# Patient Record
Sex: MALE | Race: WHITE
[De-identification: names, ages, dates, MRNs, and addresses within clinical notes are randomized per-mention and may not be internally consistent; named-entity substitution may affect disease eponyms.]

---

## 2018-02-25 ENCOUNTER — HOSPITAL ENCOUNTER (OUTPATIENT)
Dept: HOSPITAL 56 - MW.ED | Age: 68
Setting detail: OBSERVATION
LOS: 2 days | Discharge: HOME | End: 2018-02-27
Attending: FAMILY MEDICINE | Admitting: FAMILY MEDICINE
Payer: OTHER GOVERNMENT

## 2018-02-25 DIAGNOSIS — R47.81: ICD-10-CM

## 2018-02-25 DIAGNOSIS — R55: Primary | ICD-10-CM

## 2018-02-25 DIAGNOSIS — F17.210: ICD-10-CM

## 2018-02-25 LAB
CHLORIDE SERPL-SCNC: 105 MMOL/L (ref 98–110)
SODIUM SERPL-SCNC: 139 MMOL/L (ref 136–146)

## 2018-02-25 PROCEDURE — 82140 ASSAY OF AMMONIA: CPT

## 2018-02-25 PROCEDURE — 70544 MR ANGIOGRAPHY HEAD W/O DYE: CPT

## 2018-02-25 PROCEDURE — 36415 COLL VENOUS BLD VENIPUNCTURE: CPT

## 2018-02-25 PROCEDURE — 70450 CT HEAD/BRAIN W/O DYE: CPT

## 2018-02-25 PROCEDURE — 84100 ASSAY OF PHOSPHORUS: CPT

## 2018-02-25 PROCEDURE — 83735 ASSAY OF MAGNESIUM: CPT

## 2018-02-25 PROCEDURE — 85610 PROTHROMBIN TIME: CPT

## 2018-02-25 PROCEDURE — 80061 LIPID PANEL: CPT

## 2018-02-25 PROCEDURE — 84484 ASSAY OF TROPONIN QUANT: CPT

## 2018-02-25 PROCEDURE — 99285 EMERGENCY DEPT VISIT HI MDM: CPT

## 2018-02-25 PROCEDURE — 80053 COMPREHEN METABOLIC PANEL: CPT

## 2018-02-25 PROCEDURE — 81001 URINALYSIS AUTO W/SCOPE: CPT

## 2018-02-25 PROCEDURE — 93306 TTE W/DOPPLER COMPLETE: CPT

## 2018-02-25 PROCEDURE — 80305 DRUG TEST PRSMV DIR OPT OBS: CPT

## 2018-02-25 PROCEDURE — 71046 X-RAY EXAM CHEST 2 VIEWS: CPT

## 2018-02-25 PROCEDURE — 87804 INFLUENZA ASSAY W/OPTIC: CPT

## 2018-02-25 PROCEDURE — A9577 INJ MULTIHANCE: HCPCS

## 2018-02-25 PROCEDURE — 80048 BASIC METABOLIC PNL TOTAL CA: CPT

## 2018-02-25 PROCEDURE — 87086 URINE CULTURE/COLONY COUNT: CPT

## 2018-02-25 PROCEDURE — 83036 HEMOGLOBIN GLYCOSYLATED A1C: CPT

## 2018-02-25 PROCEDURE — 87186 SC STD MICRODIL/AGAR DIL: CPT

## 2018-02-25 PROCEDURE — 84443 ASSAY THYROID STIM HORMONE: CPT

## 2018-02-25 PROCEDURE — 93005 ELECTROCARDIOGRAM TRACING: CPT

## 2018-02-25 PROCEDURE — 82150 ASSAY OF AMYLASE: CPT

## 2018-02-25 PROCEDURE — 70549 MR ANGIOGRAPH NECK W/O&W/DYE: CPT

## 2018-02-25 PROCEDURE — 70553 MRI BRAIN STEM W/O & W/DYE: CPT

## 2018-02-25 PROCEDURE — 85025 COMPLETE CBC W/AUTO DIFF WBC: CPT

## 2018-02-25 PROCEDURE — G0378 HOSPITAL OBSERVATION PER HR: HCPCS

## 2018-02-25 PROCEDURE — 83880 ASSAY OF NATRIURETIC PEPTIDE: CPT

## 2018-02-25 PROCEDURE — 87088 URINE BACTERIA CULTURE: CPT

## 2018-02-25 RX ADMIN — DEXTROSE SCH: 10 SOLUTION INTRAVENOUS at 18:37

## 2018-02-25 NOTE — EDM.PDOC
ED HPI GENERAL MEDICAL PROBLEM





- General


Chief Complaint: Cardiovascular Problem


Stated Complaint: DIZZY


Time Seen by Provider: 02/25/18 14:16


Source of Information: Reports: Patient, EMS


History Limitations: Reports: No Limitations





- History of Present Illness


INITIAL COMMENTS - FREE TEXT/NARRATIVE: 





HISTORY AND PHYSICAL:


[]67-year-old male presenting by EMS after a near-syncopal episode





History of Present Illness:


[][place] farm with [place] and says all of a sudden became 

dizzy had copious amounts of fluid from his mouth it tasted salty and now has a 

headache. He has had similar small episodes like this once or twice a year for 

the last couple years





Review of Systems:


As per history of present illness and below otherwise all 


systems reviewed and negative.  





Past medical history:


As per history of present illness and as reviewed below


otherwise noncontributory.





Surgical history:


As per history of present illness and as reviewed below


otherwise noncontributory.





Social history:


No reported history of drug or alcohol abuse.





Family history:


As per history of present illness and as reviewed below


otherwise noncontributory.





Physical exam:


Alert and oriented male answering questions appropriately PERRLA hand grasps 

are equal no drift noted. Unable to really lift his left leg from the table but 

complains of a bad hip and this is not unusual.


HEENT: Atraumatic, normocehpalic, pupils reactive, negative for conjunctival 

pallor or scleral icterus, mucous membranes moist, throat clear, neck supple, 

nontender, trachea midline.  


Lungs: Mild crackles on auscultation, breath sounds equal bilaterally, chest 

non tender.  


Heart: S1S2, regular, negative for clicks, rubs, or JVD.


Abdomen: Soft, nondistended, nontender.  Negative for masses or 

hepatossplenmegaly. Negative for costovertebral tenderness.


Pelvis: Stable nontender.


Genitourinary: Deferred.


Rectal: Deferred


Extremities: Atraumatic, negative for cords or calf pain.  


Neurovascular unremarkable.


Neuro:  Awake, alert, oriented.  Cranial nerves II through XII


unremarkable.  Cerebellum unremarkable.  Motor and sensory unremarkable 

throughout.  Exam nonfocal.  





Discussed case with Dr. Conklin who has accepted the patient for observation 

on telemetry


Discussed this case with the patient and his wife who are agreeable to this 

admission.





Diagnostics:


[CBC CMP amylase lipase lactic acid EKG head CT]





Therapeutics:


[IV fluids]





Impression:


[Near syncopal episode]





Plan:


[]Refer to observation





Definitive disposition and diagnosis as appropriate pending


reevaluation and review of above.  





Onset: Today, Sudden


Duration: Hour(s):


Location: Reports: Head, Generalized


  ** headache


Pain Score (Numeric/FACES): 1





- Related Data


 Allergies











Allergy/AdvReac Type Severity Reaction Status Date / Time


 


No Known Allergies Allergy   Verified 02/25/18 13:43











Home Meds: 


 Home Meds





. [No Known Home Meds]  06/06/16 [History]











Past Medical History


HEENT History: Reports: None


Cardiovascular History: Reports: None


Respiratory History: Reports: None


Gastrointestinal History: Reports: None


Genitourinary History: Reports: None


Musculoskeletal History: Reports: Fracture


Neurological History: Reports: None


Psychiatric History: Reports: None


Endocrine/Metabolic History: Reports: None


Hematologic History: Reports: None


Immunologic History: Reports: None


Oncologic (Cancer) History: Reports: None


Dermatologic History: Reports: None





- Infectious Disease History


Infectious Disease History: Reports: Chicken Pox, Shingles





- Past Surgical History


Head Surgeries/Procedures: Reports: None


Musculoskeletal Surgical History: Reports: Shoulder Surgery





Social & Family History





- Family History


Family Medical History: Noncontributory





- Tobacco Use


Smoking Status *Q: Current Every Day Smoker


Years of Tobacco use: 35


Packs/Tins Daily: 1





- Caffeine Use


Caffeine Use: Reports: Coffee





- Recreational Drug Use


Recreational Drug Use: No





ED ROS GENERAL





- Review of Systems


Review Of Systems: ROS reveals no pertinent complaints other than HPI.





ED EXAM, GENERAL





- Physical Exam


Exam: See Below (see dictation)





EKG INTERPRETATION


EKG Date: 02/25/18


Rhythm: NSR


Comparison: NA - No Prior EKG





Course





- Vital Signs


Last Recorded V/S: 


 Last Vital Signs











Temp  36.1 C   02/25/18 13:41


 


Pulse  68   02/25/18 13:41


 


Resp  18   02/25/18 13:41


 


BP  125/78   02/25/18 13:41


 


Pulse Ox  98   02/25/18 13:41














- Orders/Labs/Meds


Orders: 


 Active Orders 24 hr











 Category Date Time Status


 


 Patient Status [ADT] Stat ADT  02/25/18 16:31 Ordered


 


 Cardiac Monitoring [RC] .AS DIRECTED Care  02/25/18 14:18 Active


 


 EKG Documentation Completion [RC] STAT Care  02/25/18 14:18 Active


 


 Oxygen Therapy, ED [RC] ASDIRECTED Care  02/25/18 14:21 Active


 


 Head wo Cont [CT] Stat Exams  02/25/18 15:35 Taken


 


 CULTURE URINE [RM] Stat Lab  02/25/18 14:59 Received


 


 Sodium Chloride 0.9% [Saline Flush] Med  02/25/18 14:18 Active





 10 ml FLUSH ASDIRECTED PRN   


 


 Sodium Chloride 0.9% [Saline Flush] Med  02/25/18 14:18 Active





 2.5 ml FLUSH ASDIRECTED PRN   


 


 Saline Lock Insert [OM.PC] Stat Oth  02/25/18 14:18 Ordered








 Medication Orders





Sodium Chloride (Saline Flush)  10 ml FLUSH ASDIRECTED PRN


   PRN Reason: Keep Vein Open


Sodium Chloride (Saline Flush)  2.5 ml FLUSH ASDIRECTED PRN


   PRN Reason: Keep Vein Open








Labs: 


 Laboratory Tests











  02/25/18 02/25/18 02/25/18 Range/Units





  14:30 14:30 14:30 


 


WBC  11.08 H    (4.0-11.0)  K/uL


 


RBC  4.35 L    (4.50-5.90)  M/uL


 


Hgb  15.0    (13.0-17.0)  g/dL


 


Hct  44.0    (38.0-50.0)  %


 


MCV  101.1 H    (80.0-98.0)  fL


 


MCH  34.5 H    (27.0-32.0)  pg


 


MCHC  34.1    (31.0-37.0)  g/dL


 


RDW Std Deviation  47.4    (28.0-62.0)  fl


 


RDW Coeff of Elizabeth  13    (11.0-15.0)  %


 


Plt Count  243    (150-400)  K/uL


 


MPV  9.60    (7.40-12.00)  fL


 


Neut % (Auto)  80.7 H    (48.0-80.0)  %


 


Lymph % (Auto)  13.3 L    (16.0-40.0)  %


 


Mono % (Auto)  4.2    (0.0-15.0)  %


 


Eos % (Auto)  1.6    (0.0-7.0)  %


 


Baso % (Auto)  0.2    (0.0-1.5)  %


 


Neut # (Auto)  8.9 H    (1.4-5.7)  K/uL


 


Lymph # (Auto)  1.5    (0.6-2.4)  K/uL


 


Mono # (Auto)  0.5    (0.0-0.8)  K/uL


 


Eos # (Auto)  0.2    (0.0-0.7)  K/uL


 


Baso # (Auto)  0.0    (0.0-0.1)  K/uL


 


Nucleated RBC %  0.0    /100WBC


 


Nucleated RBCs #  0    K/uL


 


INR   1.03   


 


Sodium    139  (136-146)  mmol/L


 


Potassium    4.9  (3.5-5.1)  mmol/L


 


Chloride    105  ()  mmol/L


 


Carbon Dioxide    25  (21-31)  mmol/L


 


BUN    21  (6.0-23.0)  mg/dL


 


Creatinine    0.9  (0.6-1.5)  mg/dL


 


Est Cr Clr Drug Dosing    72.06  mL/min


 


Estimated GFR (MDRD)    > 60.0  ml/min


 


Glucose    93  ()  mg/dL


 


Calcium    9.7  (8.8-10.8)  mg/dL


 


Total Bilirubin    0.6  (0.1-1.5)  mg/dL


 


AST    15  (5-40)  IU/L


 


ALT    13  (8-54)  IU/L


 


Alkaline Phosphatase    45  ()  


 


Ammonia     (14-68)  UG/DL


 


Troponin I    < 0.10  (0.0-0.29)  NG/ML


 


B-Natriuretic Peptide     (<100)  PG/ML


 


Total Protein    7.4  (6.0-8.0)  g/dL


 


Albumin    4.1  (3.4-4.8)  g/dL


 


Globulin    3.3  (2.0-3.5)  g/dL


 


Albumin/Globulin Ratio    1.2 L  (1.3-2.8)  


 


Amylase    74  (10-90)  U/L


 


TSH 3rd Generation    1.84  (0.47-5.0)  uIU/mL


 


Urine Color     


 


Urine Appearance     


 


Urine pH     (5.0-8.0)  


 


Ur Specific Gravity     (1.001-1.035)  


 


Urine Protein     (NEGATIVE)  mg/dL


 


Urine Glucose (UA)     (NEGATIVE)  mg/dL


 


Urine Ketones     (NEGATIVE)  mg/dL


 


Urine Occult Blood     (NEGATIVE)  


 


Urine Nitrite     (NEGATIVE)  


 


Urine Bilirubin     (NEGATIVE)  


 


Urine Urobilinogen     (<2.0)  EU/dL


 


Ur Leukocyte Esterase     (NEGATIVE)  


 


Urine RBC     (0-2/HPF)  


 


Urine WBC     (0-5/HPF)  


 


Ur Epithelial Cells     (NONE-FEW)  


 


Amorphous Sediment     (NEGATIVE)  


 


Urine Bacteria     (NEGATIVE)  


 


Urine Mucus     (NONE-MOD)  


 


Urine Opiates Screen     (NEGATIVE)  


 


Ur Oxycodone Screen     (NEGATIVE)  


 


Urine Methadone Screen     (NEGATIVE)  


 


Ur Barbiturates Screen     (NEGATIVE)  


 


Ur Phencyclidine Scrn     (NEGATIVE)  


 


Ur Amphetamine Screen     (NEGATIVE)  


 


U Methamphetamines Scrn     (NEGATIVE)  


 


U Benzodiazepines Scrn     (NEGATIVE)  


 


U Cocaine Metab Screen     (NEGATIVE)  


 


U Marijuana (THC) Screen     (NEGATIVE)  














  02/25/18 02/25/18 02/25/18 Range/Units





  14:30 14:30 14:59 


 


WBC     (4.0-11.0)  K/uL


 


RBC     (4.50-5.90)  M/uL


 


Hgb     (13.0-17.0)  g/dL


 


Hct     (38.0-50.0)  %


 


MCV     (80.0-98.0)  fL


 


MCH     (27.0-32.0)  pg


 


MCHC     (31.0-37.0)  g/dL


 


RDW Std Deviation     (28.0-62.0)  fl


 


RDW Coeff of Elizabeth     (11.0-15.0)  %


 


Plt Count     (150-400)  K/uL


 


MPV     (7.40-12.00)  fL


 


Neut % (Auto)     (48.0-80.0)  %


 


Lymph % (Auto)     (16.0-40.0)  %


 


Mono % (Auto)     (0.0-15.0)  %


 


Eos % (Auto)     (0.0-7.0)  %


 


Baso % (Auto)     (0.0-1.5)  %


 


Neut # (Auto)     (1.4-5.7)  K/uL


 


Lymph # (Auto)     (0.6-2.4)  K/uL


 


Mono # (Auto)     (0.0-0.8)  K/uL


 


Eos # (Auto)     (0.0-0.7)  K/uL


 


Baso # (Auto)     (0.0-0.1)  K/uL


 


Nucleated RBC %     /100WBC


 


Nucleated RBCs #     K/uL


 


INR     


 


Sodium     (136-146)  mmol/L


 


Potassium     (3.5-5.1)  mmol/L


 


Chloride     ()  mmol/L


 


Carbon Dioxide     (21-31)  mmol/L


 


BUN     (6.0-23.0)  mg/dL


 


Creatinine     (0.6-1.5)  mg/dL


 


Est Cr Clr Drug Dosing     mL/min


 


Estimated GFR (MDRD)     ml/min


 


Glucose     ()  mg/dL


 


Calcium     (8.8-10.8)  mg/dL


 


Total Bilirubin     (0.1-1.5)  mg/dL


 


AST     (5-40)  IU/L


 


ALT     (8-54)  IU/L


 


Alkaline Phosphatase     ()  


 


Ammonia   25   (14-68)  UG/DL


 


Troponin I     (0.0-0.29)  NG/ML


 


B-Natriuretic Peptide  < 15    (<100)  PG/ML


 


Total Protein     (6.0-8.0)  g/dL


 


Albumin     (3.4-4.8)  g/dL


 


Globulin     (2.0-3.5)  g/dL


 


Albumin/Globulin Ratio     (1.3-2.8)  


 


Amylase     (10-90)  U/L


 


TSH 3rd Generation     (0.47-5.0)  uIU/mL


 


Urine Color    YELLOW  


 


Urine Appearance    CLEAR  


 


Urine pH    6.0  (5.0-8.0)  


 


Ur Specific Gravity    >= 1.030  (1.001-1.035)  


 


Urine Protein    TRACE  (NEGATIVE)  mg/dL


 


Urine Glucose (UA)    NEGATIVE  (NEGATIVE)  mg/dL


 


Urine Ketones    NEGATIVE  (NEGATIVE)  mg/dL


 


Urine Occult Blood    TRACE-INTACT  (NEGATIVE)  


 


Urine Nitrite    NEGATIVE  (NEGATIVE)  


 


Urine Bilirubin    NEGATIVE  (NEGATIVE)  


 


Urine Urobilinogen    0.2  (<2.0)  EU/dL


 


Ur Leukocyte Esterase    NEGATIVE  (NEGATIVE)  


 


Urine RBC    0-1  (0-2/HPF)  


 


Urine WBC    RARE  (0-5/HPF)  


 


Ur Epithelial Cells    FEW  (NONE-FEW)  


 


Amorphous Sediment    LIGHT  (NEGATIVE)  


 


Urine Bacteria    FEW  (NEGATIVE)  


 


Urine Mucus    MODERATE  (NONE-MOD)  


 


Urine Opiates Screen     (NEGATIVE)  


 


Ur Oxycodone Screen     (NEGATIVE)  


 


Urine Methadone Screen     (NEGATIVE)  


 


Ur Barbiturates Screen     (NEGATIVE)  


 


Ur Phencyclidine Scrn     (NEGATIVE)  


 


Ur Amphetamine Screen     (NEGATIVE)  


 


U Methamphetamines Scrn     (NEGATIVE)  


 


U Benzodiazepines Scrn     (NEGATIVE)  


 


U Cocaine Metab Screen     (NEGATIVE)  


 


U Marijuana (THC) Screen     (NEGATIVE)  














  02/25/18 Range/Units





  14:59 


 


WBC   (4.0-11.0)  K/uL


 


RBC   (4.50-5.90)  M/uL


 


Hgb   (13.0-17.0)  g/dL


 


Hct   (38.0-50.0)  %


 


MCV   (80.0-98.0)  fL


 


MCH   (27.0-32.0)  pg


 


MCHC   (31.0-37.0)  g/dL


 


RDW Std Deviation   (28.0-62.0)  fl


 


RDW Coeff of Elizabeth   (11.0-15.0)  %


 


Plt Count   (150-400)  K/uL


 


MPV   (7.40-12.00)  fL


 


Neut % (Auto)   (48.0-80.0)  %


 


Lymph % (Auto)   (16.0-40.0)  %


 


Mono % (Auto)   (0.0-15.0)  %


 


Eos % (Auto)   (0.0-7.0)  %


 


Baso % (Auto)   (0.0-1.5)  %


 


Neut # (Auto)   (1.4-5.7)  K/uL


 


Lymph # (Auto)   (0.6-2.4)  K/uL


 


Mono # (Auto)   (0.0-0.8)  K/uL


 


Eos # (Auto)   (0.0-0.7)  K/uL


 


Baso # (Auto)   (0.0-0.1)  K/uL


 


Nucleated RBC %   /100WBC


 


Nucleated RBCs #   K/uL


 


INR   


 


Sodium   (136-146)  mmol/L


 


Potassium   (3.5-5.1)  mmol/L


 


Chloride   ()  mmol/L


 


Carbon Dioxide   (21-31)  mmol/L


 


BUN   (6.0-23.0)  mg/dL


 


Creatinine   (0.6-1.5)  mg/dL


 


Est Cr Clr Drug Dosing   mL/min


 


Estimated GFR (MDRD)   ml/min


 


Glucose   ()  mg/dL


 


Calcium   (8.8-10.8)  mg/dL


 


Total Bilirubin   (0.1-1.5)  mg/dL


 


AST   (5-40)  IU/L


 


ALT   (8-54)  IU/L


 


Alkaline Phosphatase   ()  


 


Ammonia   (14-68)  UG/DL


 


Troponin I   (0.0-0.29)  NG/ML


 


B-Natriuretic Peptide   (<100)  PG/ML


 


Total Protein   (6.0-8.0)  g/dL


 


Albumin   (3.4-4.8)  g/dL


 


Globulin   (2.0-3.5)  g/dL


 


Albumin/Globulin Ratio   (1.3-2.8)  


 


Amylase   (10-90)  U/L


 


TSH 3rd Generation   (0.47-5.0)  uIU/mL


 


Urine Color   


 


Urine Appearance   


 


Urine pH   (5.0-8.0)  


 


Ur Specific Gravity   (1.001-1.035)  


 


Urine Protein   (NEGATIVE)  mg/dL


 


Urine Glucose (UA)   (NEGATIVE)  mg/dL


 


Urine Ketones   (NEGATIVE)  mg/dL


 


Urine Occult Blood   (NEGATIVE)  


 


Urine Nitrite   (NEGATIVE)  


 


Urine Bilirubin   (NEGATIVE)  


 


Urine Urobilinogen   (<2.0)  EU/dL


 


Ur Leukocyte Esterase   (NEGATIVE)  


 


Urine RBC   (0-2/HPF)  


 


Urine WBC   (0-5/HPF)  


 


Ur Epithelial Cells   (NONE-FEW)  


 


Amorphous Sediment   (NEGATIVE)  


 


Urine Bacteria   (NEGATIVE)  


 


Urine Mucus   (NONE-MOD)  


 


Urine Opiates Screen  NEGATIVE  (NEGATIVE)  


 


Ur Oxycodone Screen  NEGATIVE  (NEGATIVE)  


 


Urine Methadone Screen  NEGATIVE  (NEGATIVE)  


 


Ur Barbiturates Screen  NEGATIVE  (NEGATIVE)  


 


Ur Phencyclidine Scrn  NEGATIVE  (NEGATIVE)  


 


Ur Amphetamine Screen  NEGATIVE  (NEGATIVE)  


 


U Methamphetamines Scrn  NEGATIVE  (NEGATIVE)  


 


U Benzodiazepines Scrn  NEGATIVE  (NEGATIVE)  


 


U Cocaine Metab Screen  NEGATIVE  (NEGATIVE)  


 


U Marijuana (THC) Screen  NEGATIVE  (NEGATIVE)  











Meds: 


Medications











Generic Name Dose Route Start Last Admin





  Trade Name Freq  PRN Reason Stop Dose Admin


 


Sodium Chloride  10 ml  02/25/18 14:18  





  Saline Flush  FLUSH   





  ASDIRECTED PRN   





  Keep Vein Open   


 


Sodium Chloride  2.5 ml  02/25/18 14:18  





  Saline Flush  FLUSH   





  ASDIRECTED PRN   





  Keep Vein Open   














Discontinued Medications














Generic Name Dose Route Start Last Admin





  Trade Name Freq  PRN Reason Stop Dose Admin


 


Sodium Chloride  1,000 mls @ 999 mls/hr  02/25/18 14:20  





  Normal Saline  IV  02/25/18 15:20  





  STAT ONE   














Departure





- Departure


Time of Disposition: 16:35


Disposition: Refer to Observation


Condition: Good


Clinical Impression: 


 Near syncope





Instructions:  Near-Syncope, Easy-to-Read


Referrals: 


PCP,None [Primary Care Provider] - 


Forms:  ED Department Discharge





- My Orders


Last 24 Hours: 


My Active Orders





02/25/18 14:18


Cardiac Monitoring [RC] .AS DIRECTED 


EKG Documentation Completion [RC] STAT 


Sodium Chloride 0.9% [Saline Flush]   10 ml FLUSH ASDIRECTED PRN 


Sodium Chloride 0.9% [Saline Flush]   2.5 ml FLUSH ASDIRECTED PRN 


Saline Lock Insert [OM.PC] Stat 





02/25/18 14:21


Oxygen Therapy, ED [RC] ASDIRECTED 





02/25/18 14:59


CULTURE URINE [RM] Stat 





02/25/18 15:35


Head wo Cont [CT] Stat 





02/25/18 16:31


Patient Status [ADT] Stat 














- Assessment/Plan


Last 24 Hours: 


My Active Orders





02/25/18 14:18


Cardiac Monitoring [RC] .AS DIRECTED 


EKG Documentation Completion [RC] STAT 


Sodium Chloride 0.9% [Saline Flush]   10 ml FLUSH ASDIRECTED PRN 


Sodium Chloride 0.9% [Saline Flush]   2.5 ml FLUSH ASDIRECTED PRN 


Saline Lock Insert [OM.PC] Stat 





02/25/18 14:21


Oxygen Therapy, ED [RC] ASDIRECTED 





02/25/18 14:59


CULTURE URINE [RM] Stat 





02/25/18 15:35


Head wo Cont [CT] Stat 





02/25/18 16:31


Patient Status [ADT] Stat

## 2018-02-25 NOTE — PCM.HP
H&P History of Present Illness





- General


Date of Service: 02/25/18


Admit Problem/Dx: 





Near syncope





Source of Information: Patient, Family


History Limitations: Reports: No Limitations





- History of Present Illness


Initial Comments - Free Text/Narative: 


67-year-old male presenting to the emergency department by EMS from [place] for a 

near syncopal episode with no significant past medical history.





Patient is accompanied by his wife who helps with the history. Patient states 

that while he was "raking corn" with his son he became lightheaded, sweaty, hot

, and felt generalized weakness. He also reports a salty taste in his mouth as 

well as nausea. He did not vomit but felt completely weak. He denies any 

specific left or right-sided weakness but generalized weakness. Observers also 

state that he was very pale. His wife and son state that he was slurring his 

speech but did not notice any facial drooping. Patient states that this has 

happened in the past approximately twice per year he has had similar episodes. 

He denies any associated chest pain, palpitations, shortness of breath, or 

syncopal events. Up until the event he was feeling his normal self. He does 

admit to a cough with some clear phlegm production. He denies any fever, chills

, diarrhea, sore throat, abdominal pain, leg pain or swelling. Patient states 

that he does smoke cigars but he does not inhale. He is a former alcoholic and 

has been abstinent for the past 18 years. He also reports that he has noticed 

over the past year that his right toes become numb and now his left toes are 

starting become numb as well. He has no history of diabetes and has been 

checked for this in the past. [facility] He reports no 

allergies to medications or recent illness.





Of note: Patient does report an approximate 40 lb wt loss over the past few 

years.  





Emergency department:





CBC showed mild leukocytosis of 11.08, CMP, troponin, UA, urine tox, influenza 

were all unremarkable.  EKG showed no acute signs of ischemia.  CT head 

unremarkable.  





Patient admitted for near syncope.  





  ** headache


Pain Score (Numeric/FACES): 1





- Related Data


Allergies/Adverse Reactions: 


 Allergies











Allergy/AdvReac Type Severity Reaction Status Date / Time


 


No Known Allergies Allergy   Verified 02/25/18 13:43











Home Medications: 


 Home Meds





. [No Known Home Meds]  06/06/16 [History]











Past Medical History


HEENT History: Reports: None


Cardiovascular History: Reports: None


Respiratory History: Reports: None


Gastrointestinal History: Reports: None


Genitourinary History: Reports: None


Musculoskeletal History: Reports: Fracture


Neurological History: Reports: None


Psychiatric History: Reports: None


Endocrine/Metabolic History: Reports: None


Hematologic History: Reports: None


Immunologic History: Reports: None


Oncologic (Cancer) History: Reports: None


Dermatologic History: Reports: None





- Infectious Disease History


Infectious Disease History: Reports: Chicken Pox, Shingles





- Past Surgical History


Head Surgeries/Procedures: Reports: None


Musculoskeletal Surgical History: Reports: Shoulder Surgery





Social & Family History





- Family History


Family Medical History: Noncontributory





- Tobacco Use


Smoking Status *Q: Current Every Day Smoker


Years of Tobacco use: 35


Packs/Tins Daily: 1





- Caffeine Use


Caffeine Use: Reports: Coffee





- Recreational Drug Use


Recreational Drug Use: No





H&P Review of Systems





- Review of Systems:


Review Of Systems: See Below


General: Reports: Weakness, Fatigue.  Denies: Fever, Chills, Malaise


HEENT: Reports: Headaches.  Denies: Sinus Congestion, Sore Throat


Pulmonary: Reports: Cough, Sputum.  Denies: Shortness of Breath, Wheezing


Cardiovascular: Denies: Chest Pain, Palpitations, Edema


Gastrointestinal: Denies: Abdominal Pain, Black Stool, Bloody Stool, Diarrhea, 

Nausea, Vomiting


Genitourinary: Denies: Dysuria, Hematuria


Musculoskeletal: Denies: Neck Pain, Leg Pain


Skin: Denies: Cyanosis


Psychiatric: Denies: Confusion


Neurological: Reports: Headache, Trouble Speaking, Weakness.  Denies: Confusion

, Dizziness


Hematologic/Lymphatic: Denies: Anemia


Immunologic: Denies: Anaphylaxis





Exam





- Exam


Exam: See Below





- Vital Signs


Vital Signs: 


 Last Vital Signs











Temp  96.9 F   02/25/18 13:41


 


Pulse  68   02/25/18 13:41


 


Resp  18   02/25/18 13:41


 


BP  125/78   02/25/18 13:41


 


Pulse Ox  98   02/25/18 13:41











Weight: 65.771 kg





- Exam


Quality Assessment: DVT Prophylaxis


General: Alert, Oriented, Cooperative


HEENT: Conjunctiva Clear, EACs Clear, EOMI, Hearing Intact, Mucosa Moist & Pink

, Nares Patent, Normal Nasal Septum, Posterior Pharynx Clear, PERRLA


Neck: Supple, Trachea Midline, 2


Lungs: Clear to Auscultation, Normal Respiratory Effort


Cardiovascular: Regular Rate, Regular Rhythm


GI/Abdominal Exam: Normal Bowel Sounds, Soft, Non-Tender, No Organomegaly, No 

Distention


 (Male) Exam: Deferred


Rectal (Males) Exam: Deferred


Back Exam: Normal Inspection


Extremities: Normal Inspection, Non-Tender, No Pedal Edema, Normal Capillary 

Refill


Peripheral Pulses: 2+: Radial (L), Radial (R), Posterior Tibial (L), Posterior 

Tibial (R), Dorsalis Pedis (L), Dorsalis Pedis (R)


Skin: Warm, Dry, Intact


Neurological: Cranial Nerves Intact, Strength Equal Bilateral, Normal Speech, 

Normal Tone, Sensation Intact


Neuro Extensive - Mental Status: Alert, Oriented x3, Normal Mood/Affect, Normal 

Cognition, Memory Intact


Neuro Extensive - Motor, Sensory, Reflexes: CN II-XII Intact.  No: Tongue 

Deviation (L), Tongue Deviation (R), Dysarthria, Receptive Aphasia, Expressive 

Aphasia, Facial palsy (L), Facial Palsy (R), Facial Palsy w Forehead, 

Hemeplagia (R), Hemeplagia (L), Pronator Drift (R), Pronator Drift (L), 

Abnormal Finger to Nose, Abnormal Heel to Alcazar, Abnormal Light Touch


Psychiatric: Alert, Normal Affect, Normal Mood





- Patient Data


Lab Results Last 24 hrs: 


 Laboratory Results - last 24 hr











  02/25/18 02/25/18 02/25/18 Range/Units





  14:30 14:30 14:30 


 


WBC  11.08 H    (4.0-11.0)  K/uL


 


RBC  4.35 L    (4.50-5.90)  M/uL


 


Hgb  15.0    (13.0-17.0)  g/dL


 


Hct  44.0    (38.0-50.0)  %


 


MCV  101.1 H    (80.0-98.0)  fL


 


MCH  34.5 H    (27.0-32.0)  pg


 


MCHC  34.1    (31.0-37.0)  g/dL


 


RDW Std Deviation  47.4    (28.0-62.0)  fl


 


RDW Coeff of Elizabeth  13    (11.0-15.0)  %


 


Plt Count  243    (150-400)  K/uL


 


MPV  9.60    (7.40-12.00)  fL


 


Neut % (Auto)  80.7 H    (48.0-80.0)  %


 


Lymph % (Auto)  13.3 L    (16.0-40.0)  %


 


Mono % (Auto)  4.2    (0.0-15.0)  %


 


Eos % (Auto)  1.6    (0.0-7.0)  %


 


Baso % (Auto)  0.2    (0.0-1.5)  %


 


Neut # (Auto)  8.9 H    (1.4-5.7)  K/uL


 


Lymph # (Auto)  1.5    (0.6-2.4)  K/uL


 


Mono # (Auto)  0.5    (0.0-0.8)  K/uL


 


Eos # (Auto)  0.2    (0.0-0.7)  K/uL


 


Baso # (Auto)  0.0    (0.0-0.1)  K/uL


 


Nucleated RBC %  0.0    /100WBC


 


Nucleated RBCs #  0    K/uL


 


INR   1.03   


 


Sodium    139  (136-146)  mmol/L


 


Potassium    4.9  (3.5-5.1)  mmol/L


 


Chloride    105  ()  mmol/L


 


Carbon Dioxide    25  (21-31)  mmol/L


 


BUN    21  (6.0-23.0)  mg/dL


 


Creatinine    0.9  (0.6-1.5)  mg/dL


 


Est Cr Clr Drug Dosing    72.06  mL/min


 


Estimated GFR (MDRD)    > 60.0  ml/min


 


Glucose    93  ()  mg/dL


 


Calcium    9.7  (8.8-10.8)  mg/dL


 


Total Bilirubin    0.6  (0.1-1.5)  mg/dL


 


AST    15  (5-40)  IU/L


 


ALT    13  (8-54)  IU/L


 


Alkaline Phosphatase    45  ()  


 


Ammonia     (14-68)  UG/DL


 


Troponin I    < 0.10  (0.0-0.29)  NG/ML


 


B-Natriuretic Peptide     (<100)  PG/ML


 


Total Protein    7.4  (6.0-8.0)  g/dL


 


Albumin    4.1  (3.4-4.8)  g/dL


 


Globulin    3.3  (2.0-3.5)  g/dL


 


Albumin/Globulin Ratio    1.2 L  (1.3-2.8)  


 


Amylase    74  (10-90)  U/L


 


TSH 3rd Generation    1.84  (0.47-5.0)  uIU/mL


 


Urine Color     


 


Urine Appearance     


 


Urine pH     (5.0-8.0)  


 


Ur Specific Gravity     (1.001-1.035)  


 


Urine Protein     (NEGATIVE)  mg/dL


 


Urine Glucose (UA)     (NEGATIVE)  mg/dL


 


Urine Ketones     (NEGATIVE)  mg/dL


 


Urine Occult Blood     (NEGATIVE)  


 


Urine Nitrite     (NEGATIVE)  


 


Urine Bilirubin     (NEGATIVE)  


 


Urine Urobilinogen     (<2.0)  EU/dL


 


Ur Leukocyte Esterase     (NEGATIVE)  


 


Urine RBC     (0-2/HPF)  


 


Urine WBC     (0-5/HPF)  


 


Ur Epithelial Cells     (NONE-FEW)  


 


Amorphous Sediment     (NEGATIVE)  


 


Urine Bacteria     (NEGATIVE)  


 


Urine Mucus     (NONE-MOD)  


 


Urine Opiates Screen     (NEGATIVE)  


 


Ur Oxycodone Screen     (NEGATIVE)  


 


Urine Methadone Screen     (NEGATIVE)  


 


Ur Barbiturates Screen     (NEGATIVE)  


 


Ur Phencyclidine Scrn     (NEGATIVE)  


 


Ur Amphetamine Screen     (NEGATIVE)  


 


U Methamphetamines Scrn     (NEGATIVE)  


 


U Benzodiazepines Scrn     (NEGATIVE)  


 


U Cocaine Metab Screen     (NEGATIVE)  


 


U Marijuana (THC) Screen     (NEGATIVE)  














  02/25/18 02/25/18 02/25/18 Range/Units





  14:30 14:30 14:59 


 


WBC     (4.0-11.0)  K/uL


 


RBC     (4.50-5.90)  M/uL


 


Hgb     (13.0-17.0)  g/dL


 


Hct     (38.0-50.0)  %


 


MCV     (80.0-98.0)  fL


 


MCH     (27.0-32.0)  pg


 


MCHC     (31.0-37.0)  g/dL


 


RDW Std Deviation     (28.0-62.0)  fl


 


RDW Coeff of Elizabeth     (11.0-15.0)  %


 


Plt Count     (150-400)  K/uL


 


MPV     (7.40-12.00)  fL


 


Neut % (Auto)     (48.0-80.0)  %


 


Lymph % (Auto)     (16.0-40.0)  %


 


Mono % (Auto)     (0.0-15.0)  %


 


Eos % (Auto)     (0.0-7.0)  %


 


Baso % (Auto)     (0.0-1.5)  %


 


Neut # (Auto)     (1.4-5.7)  K/uL


 


Lymph # (Auto)     (0.6-2.4)  K/uL


 


Mono # (Auto)     (0.0-0.8)  K/uL


 


Eos # (Auto)     (0.0-0.7)  K/uL


 


Baso # (Auto)     (0.0-0.1)  K/uL


 


Nucleated RBC %     /100WBC


 


Nucleated RBCs #     K/uL


 


INR     


 


Sodium     (136-146)  mmol/L


 


Potassium     (3.5-5.1)  mmol/L


 


Chloride     ()  mmol/L


 


Carbon Dioxide     (21-31)  mmol/L


 


BUN     (6.0-23.0)  mg/dL


 


Creatinine     (0.6-1.5)  mg/dL


 


Est Cr Clr Drug Dosing     mL/min


 


Estimated GFR (MDRD)     ml/min


 


Glucose     ()  mg/dL


 


Calcium     (8.8-10.8)  mg/dL


 


Total Bilirubin     (0.1-1.5)  mg/dL


 


AST     (5-40)  IU/L


 


ALT     (8-54)  IU/L


 


Alkaline Phosphatase     ()  


 


Ammonia   25   (14-68)  UG/DL


 


Troponin I     (0.0-0.29)  NG/ML


 


B-Natriuretic Peptide  < 15    (<100)  PG/ML


 


Total Protein     (6.0-8.0)  g/dL


 


Albumin     (3.4-4.8)  g/dL


 


Globulin     (2.0-3.5)  g/dL


 


Albumin/Globulin Ratio     (1.3-2.8)  


 


Amylase     (10-90)  U/L


 


TSH 3rd Generation     (0.47-5.0)  uIU/mL


 


Urine Color    YELLOW  


 


Urine Appearance    CLEAR  


 


Urine pH    6.0  (5.0-8.0)  


 


Ur Specific Gravity    >= 1.030  (1.001-1.035)  


 


Urine Protein    TRACE  (NEGATIVE)  mg/dL


 


Urine Glucose (UA)    NEGATIVE  (NEGATIVE)  mg/dL


 


Urine Ketones    NEGATIVE  (NEGATIVE)  mg/dL


 


Urine Occult Blood    TRACE-INTACT  (NEGATIVE)  


 


Urine Nitrite    NEGATIVE  (NEGATIVE)  


 


Urine Bilirubin    NEGATIVE  (NEGATIVE)  


 


Urine Urobilinogen    0.2  (<2.0)  EU/dL


 


Ur Leukocyte Esterase    NEGATIVE  (NEGATIVE)  


 


Urine RBC    0-1  (0-2/HPF)  


 


Urine WBC    RARE  (0-5/HPF)  


 


Ur Epithelial Cells    FEW  (NONE-FEW)  


 


Amorphous Sediment    LIGHT  (NEGATIVE)  


 


Urine Bacteria    FEW  (NEGATIVE)  


 


Urine Mucus    MODERATE  (NONE-MOD)  


 


Urine Opiates Screen     (NEGATIVE)  


 


Ur Oxycodone Screen     (NEGATIVE)  


 


Urine Methadone Screen     (NEGATIVE)  


 


Ur Barbiturates Screen     (NEGATIVE)  


 


Ur Phencyclidine Scrn     (NEGATIVE)  


 


Ur Amphetamine Screen     (NEGATIVE)  


 


U Methamphetamines Scrn     (NEGATIVE)  


 


U Benzodiazepines Scrn     (NEGATIVE)  


 


U Cocaine Metab Screen     (NEGATIVE)  


 


U Marijuana (THC) Screen     (NEGATIVE)  














  02/25/18 Range/Units





  14:59 


 


WBC   (4.0-11.0)  K/uL


 


RBC   (4.50-5.90)  M/uL


 


Hgb   (13.0-17.0)  g/dL


 


Hct   (38.0-50.0)  %


 


MCV   (80.0-98.0)  fL


 


MCH   (27.0-32.0)  pg


 


MCHC   (31.0-37.0)  g/dL


 


RDW Std Deviation   (28.0-62.0)  fl


 


RDW Coeff of Elizabeth   (11.0-15.0)  %


 


Plt Count   (150-400)  K/uL


 


MPV   (7.40-12.00)  fL


 


Neut % (Auto)   (48.0-80.0)  %


 


Lymph % (Auto)   (16.0-40.0)  %


 


Mono % (Auto)   (0.0-15.0)  %


 


Eos % (Auto)   (0.0-7.0)  %


 


Baso % (Auto)   (0.0-1.5)  %


 


Neut # (Auto)   (1.4-5.7)  K/uL


 


Lymph # (Auto)   (0.6-2.4)  K/uL


 


Mono # (Auto)   (0.0-0.8)  K/uL


 


Eos # (Auto)   (0.0-0.7)  K/uL


 


Baso # (Auto)   (0.0-0.1)  K/uL


 


Nucleated RBC %   /100WBC


 


Nucleated RBCs #   K/uL


 


INR   


 


Sodium   (136-146)  mmol/L


 


Potassium   (3.5-5.1)  mmol/L


 


Chloride   ()  mmol/L


 


Carbon Dioxide   (21-31)  mmol/L


 


BUN   (6.0-23.0)  mg/dL


 


Creatinine   (0.6-1.5)  mg/dL


 


Est Cr Clr Drug Dosing   mL/min


 


Estimated GFR (MDRD)   ml/min


 


Glucose   ()  mg/dL


 


Calcium   (8.8-10.8)  mg/dL


 


Total Bilirubin   (0.1-1.5)  mg/dL


 


AST   (5-40)  IU/L


 


ALT   (8-54)  IU/L


 


Alkaline Phosphatase   ()  


 


Ammonia   (14-68)  UG/DL


 


Troponin I   (0.0-0.29)  NG/ML


 


B-Natriuretic Peptide   (<100)  PG/ML


 


Total Protein   (6.0-8.0)  g/dL


 


Albumin   (3.4-4.8)  g/dL


 


Globulin   (2.0-3.5)  g/dL


 


Albumin/Globulin Ratio   (1.3-2.8)  


 


Amylase   (10-90)  U/L


 


TSH 3rd Generation   (0.47-5.0)  uIU/mL


 


Urine Color   


 


Urine Appearance   


 


Urine pH   (5.0-8.0)  


 


Ur Specific Gravity   (1.001-1.035)  


 


Urine Protein   (NEGATIVE)  mg/dL


 


Urine Glucose (UA)   (NEGATIVE)  mg/dL


 


Urine Ketones   (NEGATIVE)  mg/dL


 


Urine Occult Blood   (NEGATIVE)  


 


Urine Nitrite   (NEGATIVE)  


 


Urine Bilirubin   (NEGATIVE)  


 


Urine Urobilinogen   (<2.0)  EU/dL


 


Ur Leukocyte Esterase   (NEGATIVE)  


 


Urine RBC   (0-2/HPF)  


 


Urine WBC   (0-5/HPF)  


 


Ur Epithelial Cells   (NONE-FEW)  


 


Amorphous Sediment   (NEGATIVE)  


 


Urine Bacteria   (NEGATIVE)  


 


Urine Mucus   (NONE-MOD)  


 


Urine Opiates Screen  NEGATIVE  (NEGATIVE)  


 


Ur Oxycodone Screen  NEGATIVE  (NEGATIVE)  


 


Urine Methadone Screen  NEGATIVE  (NEGATIVE)  


 


Ur Barbiturates Screen  NEGATIVE  (NEGATIVE)  


 


Ur Phencyclidine Scrn  NEGATIVE  (NEGATIVE)  


 


Ur Amphetamine Screen  NEGATIVE  (NEGATIVE)  


 


U Methamphetamines Scrn  NEGATIVE  (NEGATIVE)  


 


U Benzodiazepines Scrn  NEGATIVE  (NEGATIVE)  


 


U Cocaine Metab Screen  NEGATIVE  (NEGATIVE)  


 


U Marijuana (THC) Screen  NEGATIVE  (NEGATIVE)  











Result Diagrams: 


 02/25/18 14:30





 02/25/18 14:30


Manuel Results Last 24 hrs: 


 Microbiology











 02/25/18 14:52 Influenza Type A Antigen Screen - Final





 Nasopharyngeal Swab    NEGATIVE INFLUENZA A VIRUS AG





 Influenza Type B Antigen Screen - Final





    NEGATIVE INFLUENZA B VIRUS AG














*Q Meaningful Use (ADM)





- VTE *Q


VTE Criteria *Q: 








- Stroke *Q


Stroke Criteria *Q: 








- AMI *Q


AMI Criteria *Q: 








- Problem List


(1) Slurring of speech


SNOMED Code(s): 573907202


   ICD Code: R47.81 - SLURRED SPEECH   Status: Acute   Priority: High   Current 

Visit: Yes   





(2) Near syncope


SNOMED Code(s): 103679180


   ICD Code: R55 - SYNCOPE AND COLLAPSE   Status: Acute   Priority: High   

Current Visit: Yes   


Problem List Initiated/Reviewed/Updated: Yes


Orders Last 24hrs: 


 Active Orders 24 hr











 Category Date Time Status


 


 Cardiac Monitoring [RC] .AS DIRECTED Care  02/25/18 14:18 Active


 


 EKG Documentation Completion [RC] STAT Care  02/25/18 14:18 Active


 


 Oxygen Therapy, ED [RC] ASDIRECTED Care  02/25/18 14:21 Active


 


 Head wo Cont [CT] Stat Exams  02/25/18 15:35 Taken


 


 CULTURE URINE [RM] Stat Lab  02/25/18 14:59 Received


 


 Sodium Chloride 0.9% [Saline Flush] Med  02/25/18 14:18 Active





 10 ml FLUSH ASDIRECTED PRN   


 


 Sodium Chloride 0.9% [Saline Flush] Med  02/25/18 14:18 Active





 2.5 ml FLUSH ASDIRECTED PRN   


 


 Saline Lock Insert [OM.PC] Stat Oth  02/25/18 14:18 Ordered








 Medication Orders





Sodium Chloride (Saline Flush)  10 ml FLUSH ASDIRECTED PRN


   PRN Reason: Keep Vein Open


Sodium Chloride (Saline Flush)  2.5 ml FLUSH ASDIRECTED PRN


   PRN Reason: Keep Vein Open








Assessment/Plan Comment:: 


67-year-old male admitted 2/25/184 near syncope with no significant past 

medical history.





Near syncope: Patient has had multiple episodes of this in the past. Most have 

been unobserved however this one observers reported that he did have some 

slurring of speech but no significant focal neurologic deficits. Secondary to 

this history I do think it is warranted that we do a TIA workup. He will be 

placed on telemetry and will get a lipid panel, MRI brain, MRA head and neck, 

echocardiogram, as well as hemoglobin A1c. We will also start him on an ASA 81 

mg. He did receive 325 mg emergency department. We'll also trend his troponins 

every 6 hours however there are no signs of acute ischemic changes on EKG and 

initial troponin was negative.





VTE proph: SCD, Heparin





Dispo: 1-2 days pending.  Would like to have a new PCP established on 

discharge.

## 2018-02-26 LAB
CHLORIDE SERPL-SCNC: 109 MMOL/L (ref 98–110)
SODIUM SERPL-SCNC: 141 MMOL/L (ref 136–146)

## 2018-02-26 RX ADMIN — DEXTROSE SCH: 10 SOLUTION INTRAVENOUS at 06:12

## 2018-02-26 NOTE — PCM.PN
- General Info


Date of Service: 02/26/18


Admission Dx/Problem (Free Text): 





Near syncope





Subjective Update: 





Doing well this morning. No concerns. Ambulating per self in room. No chest pain

, palpitations or SOB. No syncope or lightheadedness. 


Functional Status: Reports: Pain Controlled, Tolerating Diet, Ambulating, 

Urinating





- Review of Systems


HEENT: Reports: No Symptoms


Pulmonary: Reports: No Symptoms.  Denies: Shortness of Breath


Cardiovascular: Reports: No Symptoms.  Denies: Chest Pain


Gastrointestinal: Reports: No Symptoms.  Denies: Abdominal Pain, Nausea, 

Vomiting


Genitourinary: Reports: No Symptoms.  Denies: Dysuria, Frequency, Burning


Musculoskeletal: Reports: No Symptoms.  Denies: Neck Pain


Neurological: Reports: No Symptoms.  Denies: Confusion, Trouble Speaking


Psychiatric: Reports: No Symptoms.  Denies: Confusion





- Patient Data


Vitals - Most Recent: 


 Last Vital Signs











Temp  98.7 F   02/26/18 08:00


 


Pulse  69   02/26/18 08:00


 


Resp  18   02/26/18 08:00


 


BP  108/63   02/26/18 08:00


 


Pulse Ox  96   02/26/18 08:00








 





Orthostatic Blood Pressure [     121/78


Standing]                        


Orthostatic Blood Pressure [     135/79


Sitting]                         


Orthostatic Blood Pressure [     117/72


Supine]                          








Weight - Most Recent: 65.771 kg


I&O - Last 24 Hours: 


 Intake & Output











 02/25/18 02/26/18 02/26/18





 22:59 06:59 14:59


 


Intake Total  1550 


 


Output Total  825 


 


Balance  725 











Lab Results Last 24 Hours: 


 Laboratory Results - last 24 hr











  02/25/18 02/26/18 02/26/18 Range/Units





  20:33 02:23 02:23 


 


WBC   7.23   (4.0-11.0)  K/uL


 


RBC   3.64 L   (4.50-5.90)  M/uL


 


Hgb   12.6 L   (13.0-17.0)  g/dL


 


Hct   36.9 L   (38.0-50.0)  %


 


MCV   101.4 H   (80.0-98.0)  fL


 


MCH   34.6 H   (27.0-32.0)  pg


 


MCHC   34.1   (31.0-37.0)  g/dL


 


RDW Std Deviation   46.8   (28.0-62.0)  fl


 


RDW Coeff of Elizabeth   13   (11.0-15.0)  %


 


Plt Count   225   (150-400)  K/uL


 


MPV   9.70   (7.40-12.00)  fL


 


Neut % (Auto)   50.1   (48.0-80.0)  %


 


Lymph % (Auto)   36.0   (16.0-40.0)  %


 


Mono % (Auto)   6.2   (0.0-15.0)  %


 


Eos % (Auto)   7.1 H   (0.0-7.0)  %


 


Baso % (Auto)   0.6   (0.0-1.5)  %


 


Neut # (Auto)   3.6   (1.4-5.7)  K/uL


 


Lymph # (Auto)   2.6 H   (0.6-2.4)  K/uL


 


Mono # (Auto)   0.5   (0.0-0.8)  K/uL


 


Eos # (Auto)   0.5   (0.0-0.7)  K/uL


 


Baso # (Auto)   0.0   (0.0-0.1)  K/uL


 


Nucleated RBC %   0.0   /100WBC


 


Nucleated RBCs #   0   K/uL


 


Sodium    141  (136-146)  mmol/L


 


Potassium    3.9  (3.5-5.1)  mmol/L


 


Chloride    109  ()  mmol/L


 


Carbon Dioxide    25  (21-31)  mmol/L


 


BUN    17  (6.0-23.0)  mg/dL


 


Creatinine    1.0  (0.6-1.5)  mg/dL


 


Est Cr Clr Drug Dosing    64.86  mL/min


 


Estimated GFR (MDRD)    > 60.0  ml/min


 


Glucose    97  ()  mg/dL


 


Hemoglobin A1c     (4.5-6.2)  %


 


Calcium    8.6 L  (8.8-10.8)  mg/dL


 


Phosphorus    3.5  (2.4-4.7)  mg/dL


 


Magnesium    1.9  (1.5-2.3)  mEq/L


 


Troponin I  < 0.10    (0.0-0.29)  NG/ML


 


Triglycerides    48  ()  mg/dL


 


Cholesterol    127 L  (131-240)  mg/dL


 


LDL Cholesterol, Calc    87  ()  mg/dL


 


VLDL Cholesterol    10  (5-55)  mg/dL


 


HDL Cholesterol    30 L  (40-80)  mg/dL


 


Cholesterol/HDL Ratio    4.2  (3.3-6.0)  














  02/26/18 02/26/18 Range/Units





  02:23 02:23 


 


WBC    (4.0-11.0)  K/uL


 


RBC    (4.50-5.90)  M/uL


 


Hgb    (13.0-17.0)  g/dL


 


Hct    (38.0-50.0)  %


 


MCV    (80.0-98.0)  fL


 


MCH    (27.0-32.0)  pg


 


MCHC    (31.0-37.0)  g/dL


 


RDW Std Deviation    (28.0-62.0)  fl


 


RDW Coeff of Elizabeth    (11.0-15.0)  %


 


Plt Count    (150-400)  K/uL


 


MPV    (7.40-12.00)  fL


 


Neut % (Auto)    (48.0-80.0)  %


 


Lymph % (Auto)    (16.0-40.0)  %


 


Mono % (Auto)    (0.0-15.0)  %


 


Eos % (Auto)    (0.0-7.0)  %


 


Baso % (Auto)    (0.0-1.5)  %


 


Neut # (Auto)    (1.4-5.7)  K/uL


 


Lymph # (Auto)    (0.6-2.4)  K/uL


 


Mono # (Auto)    (0.0-0.8)  K/uL


 


Eos # (Auto)    (0.0-0.7)  K/uL


 


Baso # (Auto)    (0.0-0.1)  K/uL


 


Nucleated RBC %    /100WBC


 


Nucleated RBCs #    K/uL


 


Sodium    (136-146)  mmol/L


 


Potassium    (3.5-5.1)  mmol/L


 


Chloride    ()  mmol/L


 


Carbon Dioxide    (21-31)  mmol/L


 


BUN    (6.0-23.0)  mg/dL


 


Creatinine    (0.6-1.5)  mg/dL


 


Est Cr Clr Drug Dosing    mL/min


 


Estimated GFR (MDRD)    ml/min


 


Glucose    ()  mg/dL


 


Hemoglobin A1c   5.6  (4.5-6.2)  %


 


Calcium    (8.8-10.8)  mg/dL


 


Phosphorus    (2.4-4.7)  mg/dL


 


Magnesium    (1.5-2.3)  mEq/L


 


Troponin I  < 0.10   (0.0-0.29)  NG/ML


 


Triglycerides    ()  mg/dL


 


Cholesterol    (131-240)  mg/dL


 


LDL Cholesterol, Calc    ()  mg/dL


 


VLDL Cholesterol    (5-55)  mg/dL


 


HDL Cholesterol    (40-80)  mg/dL


 


Cholesterol/HDL Ratio    (3.3-6.0)  











Med Orders - Current: 


 Current Medications





Acetaminophen (Tylenol)  650 mg PO Q4H PRN


   PRN Reason: Pain (Mild 1-3)/fever


Aspirin (Aspirin)  81 mg PO DAILY [facility]


   Last Admin: 02/26/18 09:13 Dose:  81 mg


Morphine Sulfate (Morphine)  2 mg IVPUSH Q2H PRN


   PRN Reason: Pain (severe 7-10)


   Stop: 02/26/18 17:18


Ondansetron HCl (Zofran Odt)  4 mg PO Q4H PRN


   PRN Reason: nausea, able to take PO


Ondansetron HCl (Zofran)  4 mg IVPUSH Q4H PRN


   PRN Reason: Nausea


Sodium Chloride (Saline Flush)  10 ml FLUSH ASDIRECTED PRN


   PRN Reason: Keep Vein Open


Sodium Chloride (Saline Flush)  2.5 ml FLUSH ASDIRECTED PRN


   PRN Reason: Keep Vein Open


Temazepam (Restoril)  15 mg PO BEDTIME PRN


   PRN Reason: Sleep





Discontinued Medications





Aspirin (Aspirin)  324 mg PO ONETIME ONE


   Stop: 02/25/18 17:07


   Last Admin: 02/25/18 17:14 Dose:  324 mg


Heparin Sodium (Porcine) (Heparin Sodium)  5,000 units SUBCUT Q12H [facility]


   Last Admin: 02/26/18 06:12 Dose:  Not Given


Sodium Chloride (Normal Saline)  1,000 mls @ 999 mls/hr IV STAT ONE


   Stop: 02/25/18 15:20


   Last Admin: 02/25/18 17:00 Dose:  999 mls/hr


Lactated Ringer's (Ringers, Lactated)  1,000 mls @ 125 mls/hr IV ASDIRECTED [facility]


   Last Admin: 02/26/18 06:11 Dose:  125 mls/hr











- Exam


General: Alert, Oriented, Cooperative, No Acute Distress


Neck: Supple


Lungs: Clear to Auscultation, Normal Respiratory Effort


Cardiovascular: Regular Rate, Regular Rhythm


GI/Abdominal Exam: Normal Bowel Sounds, Soft, Non-Tender, No Organomegaly, No 

Distention, No Abnormal Bruit, No Mass, Pelvis Stable


Back Exam: Normal Inspection, Full Range of Motion


Extremities: Normal Inspection, Normal Range of Motion, Non-Tender, No Pedal 

Edema, Normal Capillary Refill


Neurological: No New Focal Deficit


Psy/Mental Status: Alert, Normal Affect, Normal Mood





- Problem List & Annotations


(1) Near syncope


SNOMED Code(s): 047372108


   Code(s): R55 - SYNCOPE AND COLLAPSE   Status: Acute   Priority: High   

Current Visit: Yes   





(2) Slurring of speech


SNOMED Code(s): 944162394


   Code(s): R47.81 - SLURRED SPEECH   Status: Acute   Priority: High   Current 

Visit: Yes   





(3) Tobacco abuse


SNOMED Code(s): 433095310


   Code(s): Z72.0 - TOBACCO USE   Status: Chronic   Current Visit: Yes   





- Problem List Review


Problem List Initiated/Reviewed/Updated: Yes





- My Orders


Last 24 Hours: 


My Active Orders





02/26/18 09:00


Aspirin   81 mg PO DAILY 





02/26/18 10:22


Orthostatic Vital Signs [RC] ASDIRECTED 














- Plan


Plan:: 


67-year-old male admitted 2/25/184 near syncope with no significant past 

medical history.





1. Near syncope: No events overnight. Continue on telemetry, SR, no ectopy of 

arrhythmia. Lipid panel today revealed, Total cholesterol 127, Triglycerides 48

, LDL 87 and HDL 30, A1c 5.6.  MRI brain, MRA head and neck, echocardiogram to 

be completed today. Continue ASA 81 mg. Troponins negative. Orthostatic VS 

negative. Possible home this evening after MRIs completed. Arrange follow up 

with PCP for now. 





VTE prophylaxis: Declining both SCDs and Heparin. Ambulation encouraged. 





Dispo: This evening or in am.

## 2018-02-26 NOTE — CR
EXAM DATE: 18



PATIENT'S AGE: 67





Patient: PABLO ARCE



Facility: [place], [place]

Patient ID: [id number]

Site Patient ID: [id number].

Site Accession #: [id number]

: 1950

Study: XRay Chest [id number]/25/2018 9:51:17 PM

Ordering Physician: Rubin An



Final Report: 

INDICATION:

Productive cough.



TECHNIQUE:

PA and lateral chest x-ray.



FINDINGS:

Old or healing right rib fractures. Heart size normal. Mild ectasia thoracic 
aorta. No focal dense infiltrate or consolidation in either lung. Lowermost 
chest not included on this exam. Chest otherwise unremarkable.





Dictated by Delvis Milligan MD @ 2018 9:57PM

(Electronic Signature)



Report Signed by Proxy.
JINA

## 2018-02-26 NOTE — CT
EXAM DATE: 18



PATIENT'S AGE: 67





Patient: PABLO ARCE



Facility: [place], [place]

Patient ID: [id number]

Site Patient ID: [id number].

Site Accession #: [id number]

: 1950

Study: CT Head WO CONT [id number]/25/2018 3:52:05 PM

Ordering Physician: Doctor Temp



Final Report: 

INDICATION:

Headache. Dizzy. Lightheaded started today.



TECHNIQUE:

CT head without IV contrast.



FINDINGS:

Tiny calcifications in the basal ganglia benign. Minimal cerebral and 
cerebellar atrophy appropriate for age. No intracranial hemorrhage, edema, or 
mass effect. Remainder negative.



Impression :

No acute intracranial disease. Chronic intracranial disease as described above.



Please note that all CT scans at this facility use dose modulation, iterative 
reconstruction, and/or weight-based dosing when appropriate to reduce radiation 
dose to as low as reasonably achievable.



Dictated by Delvis Milligan MD @ 2018 4:04PM

(Electronic Signature)



Report Signed by Proxy.
JINA

## 2018-02-27 VITALS — DIASTOLIC BLOOD PRESSURE: 88 MMHG | SYSTOLIC BLOOD PRESSURE: 136 MMHG

## 2018-02-27 NOTE — PCM.DCSUM1
**Discharge Summary





- Hospital Course


HPI Initial Comments: 


67-year-old male admitted 2/25/18 for near syncope with no significant past 

medical history.





Brief History: Patient was accompanied by his wife who helps with the history. 

Patient stated that while he was "raking corn" with his son he became 

lightheaded, sweaty, hot, and felt generalized weakness. He also reported a 

salty taste in his mouth as well as nausea. He did not vomit but felt 

completely weak. He denied any specific left or right-sided weakness but 

generalized weakness. Observers also stated that he was very pale. His wife and 

son stated that he was slurring his speech but did not notice any facial 

drooping. Patient stated that this has happened in the past approximately twice 

per year he has had similar episodes. He denied any associated chest pain, 

palpitations, shortness of breath, or syncopal events. Up until the event he 

was feeling his normal self. He did admit to a cough with some clear phlegm 

production. He denied any fever, chills, diarrhea, sore throat, abdominal pain, 

leg pain or swelling. Patient stated that he does smoke cigars but he does not 

inhale. 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 

He also reported that he has noticed over the past year that his right toes 

become numb and now his left toes are starting to become numb as well. He has 

no history of diabetes and has been checked for this in the past. [facility] 

[facility] He reported no allergies to medications or recent illness.

  Of note: Patient did report an approximate 40 lb wt loss over the past few 

years.





- Discharge Data


Discharge Date: 02/26/18


Discharge Disposition: Home, Self-Care 01


Condition: Good





- Discharge Diagnosis/Problem(s)


(1) Slurring of speech


SNOMED Code(s): 544189862


   ICD Code: R47.81 - SLURRED SPEECH   Status: Resolved   Priority: High   





(2) Near syncope


SNOMED Code(s): 172712603


   ICD Code: R55 - SYNCOPE AND COLLAPSE   Status: Acute   Priority: High   





- Patient Instructions


Diet: Usual Diet as Tolerated


Activity: Rest and Relax Today


Driving: Do Not Drive


Showering/Bathing: May Shower


Notify Provider of: Fever, Increased Pain, Nausea and/or Vomiting





- Discharge Plan


Home Medications: 


 Home Meds





. [No Known Home Meds]  06/06/16 [History]








Patient Handouts:  Near-Syncope, Easy-to-Read


Referrals: 


Pernell Swenson MD [Physician] - 03/05/18 2:30 pm





- Discharge Summary/Plan Comment


DC Time >30 min.: Yes


Discharge Summary/Plan Comment: 


67-year-old male admitted 2/25/18 for near syncope with no significant past 

medical history.





Patient was accompanied by his wife who helps with the history. Patient stated 

that while he was "raking corn" with his son he became lightheaded, sweaty, hot

, and felt generalized weakness. He also reported a salty taste in his mouth as 

well as nausea. He did not vomit but felt completely weak. He denied any 

specific left or right-sided weakness but generalized weakness. Observers also 

stated that he was very pale. His wife and son stated that he was slurring his 

speech but did not notice any facial drooping. Patient stated that this has 

happened in the past approximately twice per year he has had similar episodes. 

He denied any associated chest pain, palpitations, shortness of breath, or 

syncopal events. Up until the event he was feeling his normal self. He did 

admit to a cough with some clear phlegm production. He denied any fever, chills

, diarrhea, sore throat, abdominal pain, leg pain or swelling. Patient stated 

that he does smoke cigars but he does not inhale. He is a former alcoholic and 

has been abstinent for the past 18 years. He also reported that he has noticed 

over the past year that his right toes become numb and now his left toes are 

starting to become numb as well. He has no history of diabetes and has been 

checked for this in the past. [facility] He reported no 

allergies to medications or recent illness.





Of note: Patient did report an approximate 40 lb wt loss over the past few 

years.  





Emergency department:





CBC showed mild leukocytosis of 11.08, CMP, troponin, UA, urine tox, influenza 

were all unremarkable.  EKG showed no acute signs of ischemia.  CT head 

unremarkable.  





Patient admitted for near syncope.  





Repeat troponins were all negative.  Lipid panel revealed a total cholesterol 

of 127, LDL 87, and HDL of 30, TSH was normal and MRI/MRA of brain, head and 

neck showed no evidence of acute ischemia in the brain, mild microangiopathic 

changes and diffuse parenchymal volume loss with a normal MRA of the head and 

neck.  





Patient's near syncopal events may be related to volume depletion as patient 

admitted to not drinking fluids and prodominately caffiene throught the day but 

warrants further work up in outpatient setting.  





Of note his Urine culture did grow E-coli but was thought to be contaminate as 

patient was asymptomatic and initial UA was unremarkable.  Patient was 

discharged in good condition with follow-up appointments scheduled with a local 

PCP.  He was instructed to return to the ED if he had any new or worsening 

symptoms.








- General Info


Date of Service: 02/27/18


Admission Dx/Problem (Free Text: 





Near syncope





Subjective Update: 





Doing well. No return of symptoms.  Adamant about being discharged today after 

tests.





- Review of Systems


General: Denies: Fever, Weakness, Fatigue


HEENT: Denies: Headaches, Visual Changes


Pulmonary: Denies: Shortness of Breath, Hemoptysis


Cardiovascular: Denies: Chest Pain, Palpitations


Gastrointestinal: Denies: Abdominal Pain, Constipation, Difficulty Swallowing, 

Nausea, Vomiting


Genitourinary: Denies: Dysuria, Hematuria


Musculoskeletal: Denies: Neck Pain, Leg Pain


Skin: Denies: Cyanosis


Neurological: Denies: Confusion, Dizziness, Headache


Psychiatric: Denies: Confusion





- Patient Data


Vitals - Most Recent: 


 Last Vital Signs











Temp  98.7 F   02/27/18 00:00


 


Pulse  92   02/27/18 00:00


 


Resp  18   02/27/18 00:00


 


BP  136/88   02/27/18 00:00


 


Pulse Ox  97   02/27/18 00:00








 





Orthostatic Blood Pressure [     121/78


Standing]                        


Orthostatic Blood Pressure [     135/79


Sitting]                         


Orthostatic Blood Pressure [     117/72


Supine]                          








Weight - Most Recent: 65.771 kg


I&O - Last 24 hours: 


 Intake & Output











 02/26/18 02/27/18 02/27/18





 22:59 06:59 14:59


 


Intake Total 560 250 


 


Output Total 400 200 


 


Balance 160 50 











Med Orders - Current: 


 Current Medications








Discontinued Medications





Acetaminophen (Tylenol)  650 mg PO Q4H PRN


   PRN Reason: Pain (Mild 1-3)/fever


Artificial Tears (Refresh Plus 0.5%)  1 each EYEBOTH Q4H PRN


   PRN Reason: Itching


   Last Admin: 02/26/18 16:21 Dose:  1 each


Aspirin (Aspirin)  324 mg PO ONETIME ONE


   Stop: 02/25/18 17:07


   Last Admin: 02/25/18 17:14 Dose:  324 mg


Aspirin (Aspirin)  81 mg PO DAILY [facility]


   Last Admin: 02/26/18 09:13 Dose:  81 mg


Gadobenate Dimeglumine (Multihance)  20 ml IVPUSH ONETIME STA


   Stop: 02/26/18 20:51


   Last Admin: 02/26/18 20:50 Dose:  14 ml


Heparin Sodium (Porcine) (Heparin Sodium)  5,000 units SUBCUT Q12H [facility]


   Last Admin: 02/26/18 06:12 Dose:  Not Given


Sodium Chloride (Normal Saline)  1,000 mls @ 999 mls/hr IV STAT ONE


   Stop: 02/25/18 15:20


   Last Admin: 02/25/18 17:00 Dose:  999 mls/hr


Lactated Ringer's (Ringers, Lactated)  1,000 mls @ 125 mls/hr IV ASDIRECTED [facility]


   Last Admin: 02/26/18 06:11 Dose:  125 mls/hr


Morphine Sulfate (Morphine)  2 mg IVPUSH Q2H PRN


   PRN Reason: Pain (severe 7-10)


   Stop: 02/26/18 17:18


Ondansetron HCl (Zofran Odt)  4 mg PO Q4H PRN


   PRN Reason: nausea, able to take PO


Ondansetron HCl (Zofran)  4 mg IVPUSH Q4H PRN


   PRN Reason: Nausea


Sodium Chloride (Saline Flush)  10 ml FLUSH ASDIRECTED PRN


   PRN Reason: Keep Vein Open


Sodium Chloride (Saline Flush)  2.5 ml FLUSH ASDIRECTED PRN


   PRN Reason: Keep Vein Open


Temazepam (Restoril)  15 mg PO BEDTIME PRN


   PRN Reason: Sleep











- Exam


Quality Assessment: Reports: DVT Prophylaxis


General: Reports: Alert, Oriented, Cooperative, No Acute Distress


HEENT: Reports: Pupils Equal, Pupils Reactive, EOMI, Mucous Membr. Moist/Pink


Neck: Reports: Supple, Trachea Midline, No JVD


Lungs: Reports: Clear to Auscultation, Normal Respiratory Effort


Cardiovascular: Reports: Regular Rate, Regular Rhythm


GI/Abdominal Exam: Normal Bowel Sounds, Soft, Non-Tender, No Organomegaly, No 

Distention


 (Male) Exam: Deferred


Rectal (Males) Exam: Deferred


Back Exam: Reports: Normal Inspection, Full Range of Motion


Extremities: Normal Inspection, Non-Tender, No Pedal Edema, Normal Capillary 

Refill


Skin: Reports: Warm, Dry, Intact


Wound/Incisions: Reports: Healing Well


Neurological: Reports: No New Focal Deficit


Psy/Mental Status: Reports: Alert, Normal Affect, Normal Mood





*Q Meaningful Use (DIS)





- VTE *Q


VTE Criteria *Q: 








- Stroke *Q


Stroke Criteria *Q: 








- AMI *Q


AMI Criteria *Q:

## 2018-02-27 NOTE — MR
EXAM DATE: 18



PATIENT'S AGE: 67



Patient: PABLO ARCE



Facility: [place], [place]

Patient ID: [id number]

Site Patient ID: [id number].

Site Accession #: [id number].

: 1950

Study: MRI Head Angio [id number]/26/2018 10:17:46 PM

Ordering Physician: Rubin An



Final Report: 

EXAMINATION: MRI BRAIN, MRA HEAD AND NECK



DATE: 2018.



HISTORY: Patient with syncope and slurred speech.



TECHNIQUE: Multi-sequence, multiplanar MRI examination of the brain with and 
without contrast followed by 3D TOF MRA of the head and 3D TOF MRA of the neck 
was performed.



COMPARISON: Head CT 2018.



FINDINGS:



MRI BRAIN:



There is no restricted diffusion in the brain to indicate the presence of acute 
ischemia.



There are scattered foci of T2/FLAIR signal hyperintensity in the 
periventricular and subcortical white matter of the right frontal lobe, likely 
related to microangiopathic changes.



There is diffuse prominence of the CSF spaces, consistent with volume loss.



There is ring/ghosting artifact in the posterior fossa in the post-contrast 
images of the brain. There is no abnormal enhancement in the brain. 



The orbits are unremarkable.



The paranasal sinuses are unremarkable.



The mastoid air cells are clear.



The calvarium is unremarkable.



MRA HEAD:



The intracranial segments of the right internal carotid artery are normal.



The right posterior communicating artery is seen.



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 



The visualized portions of the right middle and anterior cerebral arteries are 
normal. 



The intracranial segments of the left internal carotid artery are normal.



The left posterior communicating artery is seen.



The visualized portions of the left middle and anterior cerebral arteries are 
normal.



The vertebral arteries are codominant. The visualized intracranial portions of 
the vertebral arteries are normal.



The basilar artery is normal.



The right posterior cerebral artery is normal. 



The left posterior cerebral artery is normal. 



MRA NECK WITHOUT CONTRAST:



The origins of the great vessels are grossly patent. The innominate artery is 
grossly patent. Both subclavian arteries are grossly patent. 



The right common carotid artery is grossly patent. The right internal and 
external carotid arteries are grossly patent.



The left common carotid artery is grossly patent. The left internal and 
external carotid arteries are grossly patent.



The right vertebral artery is grossly patent. The left vertebral artery is 
grossly patent.



IMPRESSION:



1. No evidence of acute ischemia in the brain.



2. 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 



3. Normal MRA of the head and neck.





Wade Ventura M.D.

Neurointerventionalist

[facility]

[employer] 

Pager: [phone]

Office/Appointments: [phone]

Answering Service: [phone]

[facility]: [phone]

[email or web address]

[email or web address]



Dictated by: Wade Ventura MD @ 2018 23:39:59

(Electronic Signature)



Report Signed by Proxy.
[facility]ar[facility] HospitalCHARANJIT

## 2018-02-27 NOTE — MR
EXAM DATE: 18



PATIENT'S AGE: 67



Patient: PABLO ARCE



Facility: [place], [place]

Patient ID: [id number]

Site Patient ID: [id number]

Site Accession #: [id number]

: 1950

Study: MRI Neck Angio [id number]/26/2018 11:32:24 PM

Ordering Physician: Rubin An



Final Report: 

EXAMINATION: MRI BRAIN, MRA HEAD AND NECK



DATE: 2018.



HISTORY: Patient with syncope and slurred speech.



TECHNIQUE: Multi-sequence, multiplanar MRI examination of the brain with and 
without contrast followed by 3D TOF MRA of the head and 3D TOF MRA of the neck 
was performed.



COMPARISON: Head CT 2018.



FINDINGS:



MRI BRAIN:



There is no restricted diffusion in the brain to indicate the presence of acute 
ischemia.



There are scattered foci of T2/FLAIR signal hyperintensity in the 
periventricular and subcortical white matter of the right frontal lobe, likely 
related to microangiopathic changes.



There is diffuse prominence of the CSF spaces, consistent with volume loss.



There is ring/ghosting artifact in the posterior fossa in the post-contrast 
images of the brain. There is no abnormal enhancement in the brain. 



The orbits are unremarkable.



The paranasal sinuses are unremarkable.



The mastoid air cells are clear.



The calvarium is unremarkable.



MRA HEAD:



The intracranial segments of the right internal carotid artery are normal.



The right posterior communicating artery is seen.



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 



The visualized portions of the right middle and anterior cerebral arteries are 
normal. 



The intracranial segments of the left internal carotid artery are normal.



The left posterior communicating artery is seen.



The visualized portions of the left middle and anterior cerebral arteries are 
normal.



The vertebral arteries are codominant. The visualized intracranial portions of 
the vertebral arteries are normal.



The basilar artery is normal.



The right posterior cerebral artery is normal. 



The left posterior cerebral artery is normal. 



MRA NECK WITHOUT CONTRAST:



The origins of the great vessels are grossly patent. The innominate artery is 
grossly patent. Both subclavian arteries are grossly patent. 



The right common carotid artery is grossly patent. The right internal and 
external carotid arteries are grossly patent.



The left common carotid artery is grossly patent. The left internal and 
external carotid arteries are grossly patent.



The right vertebral artery is grossly patent. The left vertebral artery is 
grossly patent.



IMPRESSION:



1. No evidence of acute ischemia in the brain.



2. 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 



3. Normal MRA of the head and neck.





Wade Ventura M.D.

Neurointerventionalist

[facility]

[facility] 

Pager: [phone]

Office/Appointments: [phone]

Answering Service: [phone]

[facility]: [phone]

[email or web address]

[email or web address]



Dictated by: Wade Ventura MD @ 2018 23:40:14

(Electronic Signature)



Report Signed by Proxy.
[facility]

## 2018-02-27 NOTE — MR
EXAM DATE: 18



PATIENT'S AGE: 67





Patient: PABLO ARCE



Facility: [place], [place]

Patient ID: [id number]

Site Patient ID: [id number].

Site Accession #: [id number]

: 1950

Study: MRI Head W/ and W/O Cont [id number]/26/2018 10:19:32 PM

Ordering Physician: Rubin An



Final Report: 

EXAMINATION: MRI BRAIN, MRA HEAD AND NECK



DATE: 2018.



HISTORY: Patient with syncope and slurred speech.



TECHNIQUE: Multi-sequence, multiplanar MRI examination of the brain with and 
without contrast followed by 3D TOF MRA of the head and 3D TOF MRA of the neck 
was performed.



COMPARISON: Head CT 2018.



FINDINGS:



MRI BRAIN:



There is no restricted diffusion in the brain to indicate the presence of acute 
ischemia.



There are scattered foci of T2/FLAIR signal hyperintensity in the 
periventricular and subcortical white matter of the right frontal lobe, likely 
related to microangiopathic changes.



There is diffuse prominence of the CSF spaces, consistent with volume loss.



There is ring/ghosting artifact in the posterior fossa in the post-contrast 
images of the brain. There is no abnormal enhancement in the brain. 



The orbits are unremarkable.



The paranasal sinuses are unremarkable.



The mastoid air cells are clear.



The calvarium is unremarkable.



MRA HEAD:



The intracranial segments of the right internal carotid artery are normal.



The right posterior communicating artery is seen.



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 



The visualized portions of the right middle and anterior cerebral arteries are 
normal. 



The intracranial segments of the left internal carotid artery are normal.



The left posterior communicating artery is seen.



The visualized portions of the left middle and anterior cerebral arteries are 
normal.



The vertebral arteries are codominant. The visualized intracranial portions of 
the vertebral arteries are normal.



The basilar artery is normal.



The right posterior cerebral artery is normal. 



The left posterior cerebral artery is normal. 



MRA NECK WITHOUT CONTRAST:



The origins of the great vessels are grossly patent. The innominate artery is 
grossly patent. Both subclavian arteries are grossly patent. 



The right common carotid artery is grossly patent. The right internal and 
external carotid arteries are grossly patent.



The left common carotid artery is grossly patent. The left internal and 
external carotid arteries are grossly patent.



The right vertebral artery is grossly patent. The left vertebral artery is 
grossly patent.



IMPRESSION:



1. No evidence of acute ischemia in the brain.



2. 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 



3. Normal MRA of the head and neck.





Wade Ventura M.D.

Neurointerventionalist

[facility]

[facility] 

Pager: [phone]

Office/Appointments: [phone]

Answering Service: [phone]

[facility]: [phone]

[email or web address]

[email or web address]



Dictated by: Wade Ventura MD @ 2018 23:40:27

(Electronic Signature)



Report Signed by Proxy.
[facility]

## 2018-03-01 NOTE — ECHO
EXAM DATE: 02/25/18



PATIENT'S AGE: 67





The echocardiogram report can be seen in this patient's EMR (Electronic Medical 
Record) in the Reports section. The report has also been scanned into PACs. 



JINA